# Patient Record
Sex: FEMALE | Race: OTHER | Employment: UNEMPLOYED | URBAN - METROPOLITAN AREA
[De-identification: names, ages, dates, MRNs, and addresses within clinical notes are randomized per-mention and may not be internally consistent; named-entity substitution may affect disease eponyms.]

---

## 2018-02-17 ENCOUNTER — HOSPITAL ENCOUNTER (OUTPATIENT)
Age: 33
Discharge: HOME | End: 2018-02-17
Payer: COMMERCIAL

## 2018-02-17 VITALS
SYSTOLIC BLOOD PRESSURE: 102 MMHG | HEART RATE: 98 BPM | DIASTOLIC BLOOD PRESSURE: 62 MMHG | OXYGEN SATURATION: 97 % | RESPIRATION RATE: 18 BRPM | TEMPERATURE: 98 F | BODY MASS INDEX: 30 KG/M2 | WEIGHT: 176 LBS

## 2018-02-17 DIAGNOSIS — Z3A.20 20 WEEKS GESTATION OF PREGNANCY: ICD-10-CM

## 2018-02-17 DIAGNOSIS — J06.9 UPPER RESPIRATORY TRACT INFECTION, UNSPECIFIED TYPE: Primary | ICD-10-CM

## 2018-02-17 PROCEDURE — 94640 AIRWAY INHALATION TREATMENT: CPT

## 2018-02-17 PROCEDURE — 99204 OFFICE O/P NEW MOD 45 MIN: CPT

## 2018-02-17 RX ORDER — AZITHROMYCIN 250 MG/1
TABLET, FILM COATED ORAL
Qty: 1 PACKAGE | Refills: 0 | Status: SHIPPED | OUTPATIENT
Start: 2018-02-17 | End: 2018-02-22

## 2018-02-17 RX ORDER — ALBUTEROL SULFATE 2.5 MG/3ML
2.5 SOLUTION RESPIRATORY (INHALATION) ONCE
Status: COMPLETED | OUTPATIENT
Start: 2018-02-17 | End: 2018-02-17

## 2018-02-17 NOTE — ED PROVIDER NOTES
Patient Seen in: [facility] [place]    History   CC: cough  HPI: Nicola Aponte 33year old female  who presents to the ER c/o cough, runny nose, congestion, generalized fatigue, generalized myalgias, intermittent fever for the with palpation to frontal or maxillary sinuses, no nasal drainage noted in nares bilat, no cobblestoning to post. Pharynx.    Oropharynx clear, posterior pharynx is without erythema and without tonsilar enlargement or exudate, uvula midline, +gag, voice is Prescribed:  Current Discharge Medication List    START taking these medications    azithromycin (ZITHROMAX Z-BRIDGET) 250 MG Oral Tab  500 mg once followed by 250 mg daily x 4 days  Qty: 1 Package Refills: 0

## 2021-12-26 ENCOUNTER — HOSPITAL ENCOUNTER (OUTPATIENT)
Age: 36
Discharge: HOME | End: 2021-12-26
Payer: COMMERCIAL

## 2021-12-26 VITALS
TEMPERATURE: 98 F | HEART RATE: 89 BPM | RESPIRATION RATE: 18 BRPM | BODY MASS INDEX: 27 KG/M2 | WEIGHT: 155 LBS | DIASTOLIC BLOOD PRESSURE: 81 MMHG | SYSTOLIC BLOOD PRESSURE: 124 MMHG | OXYGEN SATURATION: 99 %

## 2021-12-26 DIAGNOSIS — J02.0 STREPTOCOCCAL SORE THROAT: Primary | ICD-10-CM

## 2021-12-26 PROCEDURE — 99213 OFFICE O/P EST LOW 20 MIN: CPT | Performed by: NURSE PRACTITIONER

## 2021-12-26 PROCEDURE — 87880 STREP A ASSAY W/OPTIC: CPT | Performed by: NURSE PRACTITIONER

## 2021-12-26 PROCEDURE — U0002 COVID-19 LAB TEST NON-CDC: HCPCS | Performed by: NURSE PRACTITIONER

## 2021-12-26 RX ORDER — AMOXICILLIN 875 MG/1
875 TABLET, COATED ORAL 2 TIMES DAILY
Qty: 20 TABLET | Refills: 0 | Status: SHIPPED | OUTPATIENT
Start: 2021-12-26 | End: 2022-01-05

## 2021-12-26 NOTE — ED PROVIDER NOTES
Patient Seen in: [facility]      History   No chief complaint on file. Stated Complaint: cough, fever    Subjective:   HPI    This is a well appearing 28 y/o who presents with a sore throat, cough and fever for the last few days.  No poster Extraocular movements intact. Conjunctiva/sclera: Conjunctivae normal.      Pupils: Pupils are equal, round, and reactive to light. Cardiovascular:      Rate and Rhythm: Normal rate and regular rhythm. Pulses: Normal pulses.       Heart sounds: for new, worsening or any persistent conditions. All questions answered. No acute distress and cleared for home.           Medical Record Review/reassessment: I independently  reviewed available prior medical records for any recent pertinent discharge summa